# Patient Record
Sex: FEMALE | Race: WHITE | NOT HISPANIC OR LATINO | URBAN - METROPOLITAN AREA
[De-identification: names, ages, dates, MRNs, and addresses within clinical notes are randomized per-mention and may not be internally consistent; named-entity substitution may affect disease eponyms.]

---

## 2022-08-29 ENCOUNTER — OFFICE (OUTPATIENT)
Dept: URBAN - METROPOLITAN AREA CLINIC 11 | Facility: CLINIC | Age: 77
End: 2022-08-29
Payer: COMMERCIAL

## 2022-08-29 VITALS
DIASTOLIC BLOOD PRESSURE: 83 MMHG | SYSTOLIC BLOOD PRESSURE: 153 MMHG | WEIGHT: 142 LBS | HEIGHT: 65 IN | HEART RATE: 56 BPM | OXYGEN SATURATION: 98 %

## 2022-08-29 DIAGNOSIS — R93.5 ABNORMAL FINDINGS ON DIAGNOSTIC IMAGING OF OTHER ABDOMINAL R: ICD-10-CM

## 2022-08-29 PROCEDURE — 99203 OFFICE O/P NEW LOW 30 MIN: CPT | Performed by: INTERNAL MEDICINE

## 2022-08-29 RX ORDER — SODIUM SULFATE, POTASSIUM SULFATE, MAGNESIUM SULFATE 17.5; 3.13; 1.6 G/ML; G/ML; G/ML
SOLUTION, CONCENTRATE ORAL
Qty: 354 | Refills: 0 | Status: COMPLETED
Start: 2022-08-29 | End: 2022-08-31

## 2022-08-29 RX ORDER — BISACODYL 5 MG
TABLET, DELAYED RELEASE (ENTERIC COATED) ORAL
Qty: 4 | Refills: 0 | Status: COMPLETED
Start: 2022-08-29 | End: 2022-08-31

## 2022-08-31 ENCOUNTER — AMBULATORY SURGICAL CENTER (OUTPATIENT)
Dept: URBAN - METROPOLITAN AREA SURGERY 3 | Facility: SURGERY | Age: 77
End: 2022-08-31

## 2022-08-31 ENCOUNTER — AMBULATORY SURGICAL CENTER (OUTPATIENT)
Dept: URBAN - METROPOLITAN AREA SURGERY 3 | Facility: SURGERY | Age: 77
End: 2022-08-31
Payer: COMMERCIAL

## 2022-08-31 VITALS
RESPIRATION RATE: 12 BRPM | OXYGEN SATURATION: 99 % | RESPIRATION RATE: 12 BRPM | DIASTOLIC BLOOD PRESSURE: 60 MMHG | SYSTOLIC BLOOD PRESSURE: 119 MMHG | HEART RATE: 56 BPM | RESPIRATION RATE: 18 BRPM | HEIGHT: 65 IN | HEART RATE: 75 BPM | DIASTOLIC BLOOD PRESSURE: 62 MMHG | HEART RATE: 88 BPM | RESPIRATION RATE: 12 BRPM | SYSTOLIC BLOOD PRESSURE: 125 MMHG | WEIGHT: 142 LBS | OXYGEN SATURATION: 99 % | DIASTOLIC BLOOD PRESSURE: 72 MMHG | OXYGEN SATURATION: 97 % | DIASTOLIC BLOOD PRESSURE: 52 MMHG | OXYGEN SATURATION: 95 % | DIASTOLIC BLOOD PRESSURE: 60 MMHG | HEART RATE: 88 BPM | HEIGHT: 65 IN | HEART RATE: 60 BPM | SYSTOLIC BLOOD PRESSURE: 126 MMHG | DIASTOLIC BLOOD PRESSURE: 52 MMHG | TEMPERATURE: 97.6 F | HEART RATE: 88 BPM | HEART RATE: 60 BPM | TEMPERATURE: 97.6 F | HEIGHT: 65 IN | RESPIRATION RATE: 18 BRPM | SYSTOLIC BLOOD PRESSURE: 115 MMHG | SYSTOLIC BLOOD PRESSURE: 125 MMHG | HEART RATE: 60 BPM | HEART RATE: 56 BPM | SYSTOLIC BLOOD PRESSURE: 119 MMHG | RESPIRATION RATE: 17 BRPM | HEART RATE: 58 BPM | SYSTOLIC BLOOD PRESSURE: 126 MMHG | DIASTOLIC BLOOD PRESSURE: 60 MMHG | OXYGEN SATURATION: 96 % | TEMPERATURE: 97.6 F | TEMPERATURE: 98 F | SYSTOLIC BLOOD PRESSURE: 126 MMHG | DIASTOLIC BLOOD PRESSURE: 89 MMHG | SYSTOLIC BLOOD PRESSURE: 115 MMHG | WEIGHT: 142 LBS | OXYGEN SATURATION: 95 % | HEART RATE: 58 BPM | DIASTOLIC BLOOD PRESSURE: 72 MMHG | HEART RATE: 75 BPM | OXYGEN SATURATION: 96 % | RESPIRATION RATE: 17 BRPM | DIASTOLIC BLOOD PRESSURE: 89 MMHG | DIASTOLIC BLOOD PRESSURE: 52 MMHG | WEIGHT: 142 LBS | HEART RATE: 56 BPM | DIASTOLIC BLOOD PRESSURE: 89 MMHG | SYSTOLIC BLOOD PRESSURE: 115 MMHG | OXYGEN SATURATION: 95 % | RESPIRATION RATE: 15 BRPM | OXYGEN SATURATION: 96 % | OXYGEN SATURATION: 99 % | HEART RATE: 58 BPM | SYSTOLIC BLOOD PRESSURE: 125 MMHG | TEMPERATURE: 98 F | SYSTOLIC BLOOD PRESSURE: 119 MMHG | DIASTOLIC BLOOD PRESSURE: 72 MMHG | RESPIRATION RATE: 17 BRPM | HEART RATE: 75 BPM | DIASTOLIC BLOOD PRESSURE: 62 MMHG | OXYGEN SATURATION: 97 % | DIASTOLIC BLOOD PRESSURE: 62 MMHG | RESPIRATION RATE: 15 BRPM | RESPIRATION RATE: 18 BRPM | TEMPERATURE: 98 F | RESPIRATION RATE: 15 BRPM | OXYGEN SATURATION: 97 %

## 2022-08-31 DIAGNOSIS — K57.30 DIVERTICULOSIS OF LARGE INTESTINE WITHOUT PERFORATION OR ABS: ICD-10-CM

## 2022-08-31 DIAGNOSIS — R93.3 ABNORMAL FINDINGS ON DIAGNOSTIC IMAGING OF OTHER PARTS OF DI: ICD-10-CM

## 2022-08-31 PROCEDURE — 45378 DIAGNOSTIC COLONOSCOPY: CPT | Performed by: INTERNAL MEDICINE

## 2022-08-31 PROCEDURE — G8918 PT W/O PREOP ORDER IV AB PRO: HCPCS | Performed by: INTERNAL MEDICINE

## 2022-08-31 NOTE — SERVICEHPINOTES
77-year-old white female known to me from the past I saw her actually almost 20 years ago with colonoscopy that was normal. She has had a colonoscopy since by Dr. Price which was also unremarkable except for diverticulosis. That was in 2014. she presents now with some left lower quadrant pain and dyspeptic symptoms with bloating fullness and some mildly altered bowel function with occasional constipation or straining but in general has bowel movements every day. She has had prior cholecystectomy. The patient has not had weight loss and her appetite is good. There is no sign of any bleeding or true melena. There is no family history of colon cancer or polyps. She does not truly have cardiac history but we have clearance already. She did have a CT scan which suggests a potential anormality even a stricture in the sigmoid colon. She presents for discussion of potential colonoscopy. She has no major illnesses and is on no medicine other than Pepcid AC for reflux symptoms. Her reflux is intermittent and she does not have dysphagia or aspiration episodes.